# Patient Record
Sex: FEMALE | Employment: FULL TIME | URBAN - METROPOLITAN AREA
[De-identification: names, ages, dates, MRNs, and addresses within clinical notes are randomized per-mention and may not be internally consistent; named-entity substitution may affect disease eponyms.]

---

## 2017-01-17 ENCOUNTER — TELEPHONE (OUTPATIENT)
Dept: OBGYN CLINIC | Facility: CLINIC | Age: 42
End: 2017-01-17

## 2017-01-17 DIAGNOSIS — Z12.31 VISIT FOR SCREENING MAMMOGRAM: Primary | ICD-10-CM

## 2017-02-15 ENCOUNTER — OFFICE VISIT (OUTPATIENT)
Dept: FAMILY MEDICINE CLINIC | Facility: CLINIC | Age: 42
End: 2017-02-15

## 2017-02-15 VITALS
DIASTOLIC BLOOD PRESSURE: 80 MMHG | RESPIRATION RATE: 16 BRPM | SYSTOLIC BLOOD PRESSURE: 110 MMHG | OXYGEN SATURATION: 98 % | HEART RATE: 92 BPM | TEMPERATURE: 98 F

## 2017-02-15 DIAGNOSIS — J02.9 SORE THROAT: Primary | ICD-10-CM

## 2017-02-15 DIAGNOSIS — J06.9 UPPER RESPIRATORY VIRUS: ICD-10-CM

## 2017-02-15 LAB
CONTROL LINE PRESENT WITH A CLEAR BACKGROUND (YES/NO): YES YES/NO
STREP GRP A CUL-SCR: NEGATIVE

## 2017-02-15 PROCEDURE — 99213 OFFICE O/P EST LOW 20 MIN: CPT | Performed by: NURSE PRACTITIONER

## 2017-02-15 PROCEDURE — 87880 STREP A ASSAY W/OPTIC: CPT | Performed by: NURSE PRACTITIONER

## 2017-02-15 RX ORDER — CODEINE PHOSPHATE AND GUAIFENESIN 10; 100 MG/5ML; MG/5ML
5 SOLUTION ORAL 3 TIMES DAILY PRN
Qty: 118 ML | Refills: 0 | Status: SHIPPED | OUTPATIENT
Start: 2017-02-15 | End: 2019-09-17

## 2017-02-15 RX ORDER — PREDNISONE 20 MG/1
40 TABLET ORAL DAILY
Qty: 10 TABLET | Refills: 0 | Status: SHIPPED | OUTPATIENT
Start: 2017-02-15 | End: 2017-02-20

## 2017-02-15 NOTE — PROGRESS NOTES
CHIEF COMPLAINT:   Patient presents with:  Pharyngitis: x 7 days. has been waxing and waning. cough. no sob or wheezing. worse at night, keeping her up. no fevers. appetite normal. congestion.          HPI:   Rob Rawls is a 42year old female pre GENERAL: well developed, well nourished,in no apparent distress  SKIN: no rashes,no suspicious lesions  HEAD: atraumatic, normocephalic  EYES: conjunctiva clear, EOM intact  EARS: TM's clear, non-injected, no bulging, retraction, or fluid bilaterally  NOSE Discussed possibility of mononucleosis infection, but at this time symptoms are not reflective of mono infection. If s/sx persist will consider MonoSpot or further lab work.      Comfort measures explained and discussed as listed in Patient Instructions · Your appetite may be poor, so a light diet is fine. Avoid dehydration by drinking 6 to 8 glasses of fluids per day (water, soft drinks, juices, tea, or soup). Extra fluids will help loosen secretions in the nose and lungs.   · Over-the-counter cold medici

## 2017-02-24 ENCOUNTER — HOSPITAL ENCOUNTER (OUTPATIENT)
Dept: MAMMOGRAPHY | Facility: HOSPITAL | Age: 42
Discharge: HOME | End: 2017-02-24
Attending: OBSTETRICS & GYNECOLOGY
Payer: COMMERCIAL

## 2017-02-24 DIAGNOSIS — Z12.31 VISIT FOR SCREENING MAMMOGRAM: ICD-10-CM

## 2017-02-24 PROCEDURE — 77067 SCR MAMMO BI INCL CAD: CPT

## 2017-12-08 ENCOUNTER — IMMUNIZATION (OUTPATIENT)
Dept: FAMILY MEDICINE CLINIC | Facility: CLINIC | Age: 42
End: 2017-12-08

## 2017-12-08 DIAGNOSIS — Z23 NEED FOR VACCINATION: ICD-10-CM

## 2017-12-08 PROCEDURE — 90471 IMMUNIZATION ADMIN: CPT | Performed by: NURSE PRACTITIONER

## 2017-12-08 PROCEDURE — 90686 IIV4 VACC NO PRSV 0.5 ML IM: CPT | Performed by: NURSE PRACTITIONER

## 2018-06-20 ENCOUNTER — TELEPHONE (OUTPATIENT)
Dept: OBGYN CLINIC | Facility: CLINIC | Age: 43
End: 2018-06-20

## 2018-06-20 NOTE — TELEPHONE ENCOUNTER
[phone] (home)  Per pt found lump on breast, would liek to be seen asap. MLM's first available next week. Pt ok with seeing other provider for breast exam asap. Apt made with AJb for tomorrow.

## 2018-06-21 ENCOUNTER — OFFICE VISIT (OUTPATIENT)
Dept: OBGYN CLINIC | Facility: CLINIC | Age: 43
End: 2018-06-21

## 2018-06-21 VITALS — SYSTOLIC BLOOD PRESSURE: 129 MMHG | DIASTOLIC BLOOD PRESSURE: 87 MMHG

## 2018-06-21 DIAGNOSIS — N63.0 BREAST SWELLING: Primary | ICD-10-CM

## 2018-06-21 PROCEDURE — 99213 OFFICE O/P EST LOW 20 MIN: CPT | Performed by: OBSTETRICS & GYNECOLOGY

## 2018-06-21 RX ORDER — HYDROCODONE BITARTRATE AND ACETAMINOPHEN 10; 325 MG/1; MG/1
1 TABLET ORAL
COMMUNITY
Start: 2017-01-31 | End: 2019-09-17

## 2018-06-21 NOTE — PROGRESS NOTES
HPI:    Patient ID: Philly Sultnaa is a 36year old female. HPI  New patient problem  Patient of Dr. Quintin Tubbs noting that her right breast is sore, tender and swollen. Involving the inner half. Denies trauma or discrete lump noted.   Denies re

## 2018-06-22 ENCOUNTER — HOSPITAL ENCOUNTER (OUTPATIENT)
Dept: ULTRASOUND IMAGING | Facility: HOSPITAL | Age: 43
Discharge: HOME | End: 2018-06-22
Attending: OBSTETRICS & GYNECOLOGY
Payer: COMMERCIAL

## 2018-06-22 ENCOUNTER — HOSPITAL ENCOUNTER (OUTPATIENT)
Dept: MAMMOGRAPHY | Facility: HOSPITAL | Age: 43
Discharge: HOME | End: 2018-06-22
Attending: OBSTETRICS & GYNECOLOGY
Payer: COMMERCIAL

## 2018-06-22 DIAGNOSIS — N63.0 BREAST SWELLING: ICD-10-CM

## 2018-06-22 PROCEDURE — 77066 DX MAMMO INCL CAD BI: CPT | Performed by: OBSTETRICS & GYNECOLOGY

## 2018-06-22 PROCEDURE — 76642 ULTRASOUND BREAST LIMITED: CPT | Performed by: OBSTETRICS & GYNECOLOGY

## 2018-06-23 ENCOUNTER — TELEPHONE (OUTPATIENT)
Dept: OBGYN CLINIC | Facility: CLINIC | Age: 43
End: 2018-06-23

## 2018-06-23 NOTE — TELEPHONE ENCOUNTER
Pt informed of result. Verbalized understanding. Will call back Monday to make apt with Dr. Evelyn Naqvi. No further question.

## 2018-06-23 NOTE — TELEPHONE ENCOUNTER
----- Message from Sarabjit Mason MD sent at 6/22/2018  3:11 PM CDT -----  Please inform that mammogram and ultrasound do not show any abnormalities. She should return for a breast exam with Dr. Geraldine Cotto in < month preferably after menses.

## 2019-09-17 ENCOUNTER — OFFICE VISIT (OUTPATIENT)
Dept: OBGYN CLINIC | Facility: CLINIC | Age: 44
End: 2019-09-17
Payer: COMMERCIAL

## 2019-09-17 VITALS
BODY MASS INDEX: 24 KG/M2 | WEIGHT: 147 LBS | DIASTOLIC BLOOD PRESSURE: 89 MMHG | HEART RATE: 90 BPM | SYSTOLIC BLOOD PRESSURE: 125 MMHG

## 2019-09-17 DIAGNOSIS — Z01.419 WOMEN'S ANNUAL ROUTINE GYNECOLOGICAL EXAMINATION: Primary | ICD-10-CM

## 2019-09-17 DIAGNOSIS — R92.2 DENSE BREAST: ICD-10-CM

## 2019-09-17 DIAGNOSIS — Z12.31 SCREENING MAMMOGRAM, ENCOUNTER FOR: ICD-10-CM

## 2019-09-17 DIAGNOSIS — Z98.82 H/O BILATERAL BREAST IMPLANTS: ICD-10-CM

## 2019-09-17 DIAGNOSIS — F41.9 ANXIETY AND DEPRESSION: ICD-10-CM

## 2019-09-17 DIAGNOSIS — F32.A ANXIETY AND DEPRESSION: ICD-10-CM

## 2019-09-17 PROCEDURE — 99396 PREV VISIT EST AGE 40-64: CPT | Performed by: ADVANCED PRACTICE MIDWIFE

## 2019-09-17 RX ORDER — ZOLPIDEM TARTRATE 10 MG/1
TABLET ORAL NIGHTLY PRN
COMMUNITY

## 2019-09-17 RX ORDER — BUPROPION HYDROCHLORIDE 150 MG/1
150 TABLET ORAL DAILY
Qty: 90 TABLET | Refills: 1 | Status: SHIPPED | OUTPATIENT
Start: 2019-09-17 | End: 2019-09-17

## 2019-09-17 RX ORDER — HYDROCHLOROTHIAZIDE 25 MG/1
TABLET ORAL
Refills: 1 | COMMUNITY
Start: 2019-08-06

## 2019-09-17 RX ORDER — AMLODIPINE BESYLATE 5 MG/1
TABLET ORAL
Refills: 1 | COMMUNITY
Start: 2019-08-06

## 2019-09-17 RX ORDER — BUPROPION HYDROCHLORIDE 150 MG/1
150 TABLET ORAL DAILY
Qty: 90 TABLET | Refills: 1 | Status: SHIPPED | OUTPATIENT
Start: 2019-09-17 | End: 2020-03-04

## 2019-09-17 NOTE — PROGRESS NOTES
HPI:    Patient ID: Aixa Quigley is a 38year old female. Has a history of anxiety and depression. Has been on medication in the past.  Is asking to restart medication today. Had a hysterectomy 2 years ago for AUB.   No past h/o abnormal pap smea N/A 2016    Performed by Farzaneh Torres MD at [facility] MAIN OR   • BREAST SURGERY      breast augmentation   •       x2   • COLONOSCOPY     • FOOT SURGERY Left    • OTHER SURGICAL HISTORY      ureterocele surgery      Family History change and no tenderness. Abdominal: Soft. She exhibits no distension and no mass. There is no tenderness. There is no rebound and no guarding. Genitourinary: Rectum normal and vagina normal. There is no rash, tenderness or lesion on the right labia.  Annemarie Yeung Imaging & Referrals:  [facility] ABRAN 2D+3D SCREENING BILAT (CPT=77067/38315)       #[id number]

## 2019-09-18 LAB — HPV I/H RISK 1 DNA SPEC QL NAA+PROBE: POSITIVE

## 2019-09-19 LAB
HPV16 DNA CVX QL PROBE+SIG AMP: NEGATIVE
HPV18 DNA CVX QL PROBE+SIG AMP: NEGATIVE

## 2019-09-23 ENCOUNTER — TELEPHONE (OUTPATIENT)
Dept: OBGYN CLINIC | Facility: CLINIC | Age: 44
End: 2019-09-23

## 2019-09-23 ENCOUNTER — MED REC SCAN ONLY (OUTPATIENT)
Dept: OBGYN CLINIC | Facility: CLINIC | Age: 44
End: 2019-09-23

## 2019-09-23 NOTE — TELEPHONE ENCOUNTER
Discussed results of pap smear. Suggested that she see Dr. Den Gilbert.   Will send pathology and labs to him and she should call for appt tomorrow

## 2019-09-23 NOTE — TELEPHONE ENCOUNTER
----- Message from Mary Lopez sent at 9/23/2019  1:41 PM CDT -----  Regarding: RE: Test Results Question  Contact: [phone]  Could you please call me? I am very concerned.     Thank you,    Talib Martínez  [phone]  ----- Message -----  From: Teresa Ramirez

## 2019-09-25 ENCOUNTER — HOSPITAL ENCOUNTER (OUTPATIENT)
Dept: MAMMOGRAPHY | Age: 44
Discharge: HOME | End: 2019-09-25
Attending: ADVANCED PRACTICE MIDWIFE
Payer: COMMERCIAL

## 2019-09-25 DIAGNOSIS — R92.2 DENSE BREAST: ICD-10-CM

## 2019-09-25 DIAGNOSIS — Z98.82 H/O BILATERAL BREAST IMPLANTS: ICD-10-CM

## 2019-09-25 PROCEDURE — 77063 BREAST TOMOSYNTHESIS BI: CPT | Performed by: ADVANCED PRACTICE MIDWIFE

## 2019-09-25 PROCEDURE — 77067 SCR MAMMO BI INCL CAD: CPT | Performed by: ADVANCED PRACTICE MIDWIFE

## 2019-09-25 NOTE — TELEPHONE ENCOUNTER
Marita Montenegro, APRN         9/23/19 3:54 PM   Note      Discussed results of pap smear. Suggested that she see Dr. Octaviano Garcia.   Will send pathology and labs to him and she should call for appt tomorrow

## 2019-09-30 ENCOUNTER — HOSPITAL ENCOUNTER (OUTPATIENT)
Dept: ULTRASOUND IMAGING | Facility: HOSPITAL | Age: 44
Discharge: HOME | End: 2019-09-30
Attending: ADVANCED PRACTICE MIDWIFE
Payer: COMMERCIAL

## 2019-09-30 ENCOUNTER — TELEPHONE (OUTPATIENT)
Dept: OBGYN CLINIC | Facility: CLINIC | Age: 44
End: 2019-09-30

## 2019-09-30 ENCOUNTER — HOSPITAL ENCOUNTER (OUTPATIENT)
Dept: MAMMOGRAPHY | Facility: HOSPITAL | Age: 44
Discharge: HOME | End: 2019-09-30
Attending: ADVANCED PRACTICE MIDWIFE
Payer: COMMERCIAL

## 2019-09-30 DIAGNOSIS — R92.8 ABNORMAL MAMMOGRAM: ICD-10-CM

## 2019-09-30 DIAGNOSIS — R92.2 INCONCLUSIVE MAMMOGRAM: Primary | ICD-10-CM

## 2019-09-30 PROCEDURE — 77061 BREAST TOMOSYNTHESIS UNI: CPT | Performed by: ADVANCED PRACTICE MIDWIFE

## 2019-09-30 PROCEDURE — 76642 ULTRASOUND BREAST LIMITED: CPT | Performed by: ADVANCED PRACTICE MIDWIFE

## 2019-09-30 PROCEDURE — 77065 DX MAMMO INCL CAD UNI: CPT | Performed by: ADVANCED PRACTICE MIDWIFE

## 2019-09-30 NOTE — TELEPHONE ENCOUNTER
Pt informed per SJM, recommendation for 6 month right breast follow up was from recent additional views of right breast.   Pt verbalized understanding. No further question.

## 2019-09-30 NOTE — TELEPHONE ENCOUNTER
Pt informed of message below. Pt voices she already had second mammogram done today. Pt was told to follow up in 6 months with a right breast u/s. Does pt still need additional views?     Notes recorded by NOELLE Merchant on 9/30/2019 at 11:09 AM LUIST

## 2019-09-30 NOTE — TELEPHONE ENCOUNTER
No I believe the additional views were done and this is what gave the information of 6 month f/u.   I did not believe the orders had been placed

## 2020-02-27 ENCOUNTER — PATIENT MESSAGE (OUTPATIENT)
Dept: OBGYN CLINIC | Facility: CLINIC | Age: 45
End: 2020-02-27

## 2020-02-27 DIAGNOSIS — N64.4 BREAST PAIN, RIGHT: ICD-10-CM

## 2020-02-27 DIAGNOSIS — N63.0 LUMP OR MASS IN BREAST: Primary | ICD-10-CM

## 2020-02-29 NOTE — TELEPHONE ENCOUNTER
RN placed call to patient. Pt advised of recommendation for clinical breast exam and right breast diagnostic mammo. Order entered for diagnostic mammo. Pt scheduled for office visit with SJM on 3/4/2020. Provider notified.

## 2020-03-04 ENCOUNTER — OFFICE VISIT (OUTPATIENT)
Dept: OBGYN CLINIC | Facility: CLINIC | Age: 45
End: 2020-03-04
Payer: COMMERCIAL

## 2020-03-04 VITALS — BODY MASS INDEX: 24 KG/M2 | SYSTOLIC BLOOD PRESSURE: 120 MMHG | WEIGHT: 149 LBS | DIASTOLIC BLOOD PRESSURE: 80 MMHG

## 2020-03-04 DIAGNOSIS — N64.4 BREAST PAIN, RIGHT: Primary | ICD-10-CM

## 2020-03-04 PROCEDURE — 99213 OFFICE O/P EST LOW 20 MIN: CPT | Performed by: ADVANCED PRACTICE MIDWIFE

## 2020-03-04 NOTE — PROGRESS NOTES
S  Is having pain in the right breast.  Comes and goes. Is a sharp shooting pain. Is not having menses. Consumes 1 cup or coffeee or diet coke daily. Occasionally takes vitamins. Has not started any new exercise. Has not purchased any new bras.   Leanna Barriga

## 2020-03-05 ENCOUNTER — HOSPITAL ENCOUNTER (OUTPATIENT)
Dept: ULTRASOUND IMAGING | Facility: HOSPITAL | Age: 45
Discharge: HOME | End: 2020-03-05
Attending: ADVANCED PRACTICE MIDWIFE
Payer: COMMERCIAL

## 2020-03-05 ENCOUNTER — HOSPITAL ENCOUNTER (OUTPATIENT)
Dept: MAMMOGRAPHY | Facility: HOSPITAL | Age: 45
Discharge: HOME | End: 2020-03-05
Attending: ADVANCED PRACTICE MIDWIFE
Payer: COMMERCIAL

## 2020-03-05 DIAGNOSIS — N63.0 LUMP OR MASS IN BREAST: ICD-10-CM

## 2020-03-05 DIAGNOSIS — N64.4 BREAST PAIN, RIGHT: ICD-10-CM

## 2020-03-05 PROCEDURE — 76642 ULTRASOUND BREAST LIMITED: CPT | Performed by: ADVANCED PRACTICE MIDWIFE

## 2020-03-05 PROCEDURE — 77065 DX MAMMO INCL CAD UNI: CPT | Performed by: ADVANCED PRACTICE MIDWIFE

## 2020-03-05 PROCEDURE — 77061 BREAST TOMOSYNTHESIS UNI: CPT | Performed by: ADVANCED PRACTICE MIDWIFE

## 2020-03-19 RX ORDER — BUPROPION HYDROCHLORIDE 150 MG/1
TABLET ORAL
Qty: 90 TABLET | Refills: 1 | OUTPATIENT
Start: 2020-03-19

## 2020-10-15 ENCOUNTER — TELEPHONE (OUTPATIENT)
Dept: OBGYN CLINIC | Facility: CLINIC | Age: 45
End: 2020-10-15

## 2020-10-15 DIAGNOSIS — Z12.31 ENCOUNTER FOR SCREENING MAMMOGRAM FOR MALIGNANT NEOPLASM OF BREAST: Primary | ICD-10-CM

## 2020-10-15 NOTE — TELEPHONE ENCOUNTER
Nitza Galeas  Patient HM Schedule Request Pool 2 days ago        Appointment Request From: Talita Farias     With Provider: Ivonne Jauregui MD [facility] ]     Preferred Date Range: From 10/13/2020 To 10/23/20

## 2020-12-15 ENCOUNTER — HOSPITAL ENCOUNTER (OUTPATIENT)
Dept: MAMMOGRAPHY | Facility: HOSPITAL | Age: 45
Discharge: HOME | End: 2020-12-15
Attending: ADVANCED PRACTICE MIDWIFE
Payer: COMMERCIAL

## 2020-12-15 ENCOUNTER — TELEPHONE (OUTPATIENT)
Dept: OBGYN CLINIC | Facility: CLINIC | Age: 45
End: 2020-12-15

## 2020-12-15 DIAGNOSIS — Z87.898 HISTORY OF ABNORMAL MAMMOGRAM: Primary | ICD-10-CM

## 2020-12-15 DIAGNOSIS — Z12.31 ENCOUNTER FOR SCREENING MAMMOGRAM FOR MALIGNANT NEOPLASM OF BREAST: ICD-10-CM

## 2020-12-15 NOTE — TELEPHONE ENCOUNTER
Patient called very frustrated. She was scheduled today for a 6 month diagnostic breast mammogram and ultrasound.   Turns out the appointment was scheduled improperly because the order was written for a 12 month routine mammogram and she was turned away an

## 2020-12-16 NOTE — TELEPHONE ENCOUNTER
Noted per recommendations of most recent right breast dx imaging from 03/05/2020  pt was to have a follow up Diagnostic Mammogram bilateral in 6 months with u/s of right breast  for f/u of right breast mass. Also has hx of breast implants per epic.   Pended

## 2020-12-22 NOTE — TELEPHONE ENCOUNTER
Called mammogram dept to verify pt's order has been placed correctly. Spoke with Leodan whom states that the order is correct.  Per Leodan pt can call central scheduling and can attempt to be scheduled asap after explaining the situation she went through [facility]

## 2021-01-13 ENCOUNTER — HOSPITAL ENCOUNTER (OUTPATIENT)
Dept: MAMMOGRAPHY | Facility: HOSPITAL | Age: 46
Discharge: HOME | End: 2021-01-13
Attending: ADVANCED PRACTICE MIDWIFE
Payer: COMMERCIAL

## 2021-01-13 ENCOUNTER — HOSPITAL ENCOUNTER (OUTPATIENT)
Dept: ULTRASOUND IMAGING | Facility: HOSPITAL | Age: 46
Discharge: HOME | End: 2021-01-13
Attending: ADVANCED PRACTICE MIDWIFE
Payer: COMMERCIAL

## 2021-01-13 DIAGNOSIS — Z87.898 HISTORY OF ABNORMAL MAMMOGRAM: ICD-10-CM

## 2021-01-13 PROCEDURE — 77062 BREAST TOMOSYNTHESIS BI: CPT | Performed by: ADVANCED PRACTICE MIDWIFE

## 2021-01-13 PROCEDURE — 77066 DX MAMMO INCL CAD BI: CPT | Performed by: ADVANCED PRACTICE MIDWIFE

## 2021-01-13 PROCEDURE — 76642 ULTRASOUND BREAST LIMITED: CPT | Performed by: ADVANCED PRACTICE MIDWIFE

## 2021-04-13 ENCOUNTER — LAB ENCOUNTER (OUTPATIENT)
Dept: LAB | Age: 46
End: 2021-04-13
Attending: INTERNAL MEDICINE
Payer: COMMERCIAL

## 2021-04-13 ENCOUNTER — OFFICE VISIT (OUTPATIENT)
Dept: INTERNAL MEDICINE CLINIC | Facility: CLINIC | Age: 46
End: 2021-04-13
Payer: COMMERCIAL

## 2021-04-13 VITALS
HEART RATE: 88 BPM | DIASTOLIC BLOOD PRESSURE: 80 MMHG | TEMPERATURE: 97 F | SYSTOLIC BLOOD PRESSURE: 100 MMHG | WEIGHT: 168 LBS | BODY MASS INDEX: 27 KG/M2 | HEIGHT: 66 IN

## 2021-04-13 DIAGNOSIS — R19.4 BOWEL HABIT CHANGES: ICD-10-CM

## 2021-04-13 DIAGNOSIS — Z76.89 ENCOUNTER TO ESTABLISH CARE: Primary | ICD-10-CM

## 2021-04-13 DIAGNOSIS — Z00.00 ANNUAL PHYSICAL EXAM: ICD-10-CM

## 2021-04-13 PROCEDURE — 3074F SYST BP LT 130 MM HG: CPT | Performed by: INTERNAL MEDICINE

## 2021-04-13 PROCEDURE — 36415 COLL VENOUS BLD VENIPUNCTURE: CPT | Performed by: INTERNAL MEDICINE

## 2021-04-13 PROCEDURE — 85652 RBC SED RATE AUTOMATED: CPT

## 2021-04-13 PROCEDURE — 3079F DIAST BP 80-89 MM HG: CPT | Performed by: INTERNAL MEDICINE

## 2021-04-13 PROCEDURE — 84439 ASSAY OF FREE THYROXINE: CPT

## 2021-04-13 PROCEDURE — 80053 COMPREHEN METABOLIC PANEL: CPT

## 2021-04-13 PROCEDURE — 3008F BODY MASS INDEX DOCD: CPT | Performed by: INTERNAL MEDICINE

## 2021-04-13 PROCEDURE — 83540 ASSAY OF IRON: CPT

## 2021-04-13 PROCEDURE — 85025 COMPLETE CBC W/AUTO DIFF WBC: CPT

## 2021-04-13 PROCEDURE — 84443 ASSAY THYROID STIM HORMONE: CPT

## 2021-04-13 PROCEDURE — 80061 LIPID PANEL: CPT

## 2021-04-13 PROCEDURE — 84466 ASSAY OF TRANSFERRIN: CPT

## 2021-04-13 PROCEDURE — 99203 OFFICE O/P NEW LOW 30 MIN: CPT | Performed by: INTERNAL MEDICINE

## 2021-04-13 PROCEDURE — 83036 HEMOGLOBIN GLYCOSYLATED A1C: CPT | Performed by: INTERNAL MEDICINE

## 2021-04-13 RX ORDER — ESCITALOPRAM OXALATE 10 MG/1
15 TABLET ORAL DAILY
COMMUNITY
Start: 2021-03-22

## 2021-04-13 NOTE — PROGRESS NOTES
HPI:    Patient ID: Jj Maravilla is a 54year old female.     HPIpatient is new to the practice with a few months complaints of irregularity of bowel movements, loose, more frequent , intermittent abdominal pain which migrates, dark stools, and some B mouth nightly as needed. Allergies:No Known Allergies   PHYSICAL EXAM:   /80   Pulse 88   Temp 97.4 °F (36.3 °C)   Ht 5' 6\" (1.676 m)   Wt 168 lb (76.2 kg)   LMP 11/03/2016   BMI 27.12 kg/m²      Physical Exam  Vitals reviewed.    Amelie habit changes  Check thyroid functions, patient tried and failed a Gluten free diet. Check sed rate  See GI   Check cbc and iron levels. Encounter to establish care  Physical today .    Labs   See Gyne        Orders Placed This Encounter      CBC W

## 2021-04-13 NOTE — ASSESSMENT & PLAN NOTE
Check thyroid functions, patient tried and failed a Gluten free diet. Check sed rate  See GI   Check cbc and iron levels.

## 2021-04-29 NOTE — PROGRESS NOTES
[address] [facility] [address] Gastroenterology                                                                                                  Clinic History and Physical     Pa Unremarkable    Social Hx:  - No smoking  - No ETOH  - Denies illicit drug use  - LMP: Hysterectomy  - Occupation: Real estate  - Lives with spouse  - NSAIDs/ASA use: PRN    History, Medications, Allergies, ROS:      Past Medical History:   Diagnosis Date Allergies:  No Known Allergies    ROS:   CONSTITUTIONAL:  negative for fevers, rigors  EYES:  negative for diplopia   RESPIRATORY:  negative for severe shortness of breath  CARDIOVASCULAR:  negative for crushing sub-sternal chest pain  GASTROINTESTIN habits/rectal bleeding: The patient reports a 2-3-month history of irregular bowel habits which between looser/diarrheal stools and constipation with rectal bleeding. She denies hematochezia. She describes dark stools however this has since resolved.   CB of delayed/missed diagnosis), benefits, and alternatives to colonoscopy with the patient [who demonstrated understanding], including but not limited to the risks of bleeding, infection, pain, as well as the risks of anesthesia and perforation all leading t

## 2021-05-13 ENCOUNTER — OFFICE VISIT (OUTPATIENT)
Dept: GASTROENTEROLOGY | Facility: CLINIC | Age: 46
End: 2021-05-13
Payer: COMMERCIAL

## 2021-05-13 ENCOUNTER — LAB ENCOUNTER (OUTPATIENT)
Dept: LAB | Facility: HOSPITAL | Age: 46
End: 2021-05-13
Attending: NURSE PRACTITIONER
Payer: COMMERCIAL

## 2021-05-13 ENCOUNTER — TELEPHONE (OUTPATIENT)
Dept: GASTROENTEROLOGY | Facility: CLINIC | Age: 46
End: 2021-05-13

## 2021-05-13 VITALS
HEIGHT: 66 IN | SYSTOLIC BLOOD PRESSURE: 127 MMHG | WEIGHT: 165 LBS | BODY MASS INDEX: 26.52 KG/M2 | HEART RATE: 87 BPM | DIASTOLIC BLOOD PRESSURE: 84 MMHG

## 2021-05-13 DIAGNOSIS — Z12.12 SCREENING FOR COLORECTAL CANCER: Primary | ICD-10-CM

## 2021-05-13 DIAGNOSIS — Z83.71 FAMILY HISTORY OF COLONIC POLYPS: ICD-10-CM

## 2021-05-13 DIAGNOSIS — R19.4 ALTERED BOWEL HABITS: ICD-10-CM

## 2021-05-13 DIAGNOSIS — Z12.11 SCREENING FOR COLORECTAL CANCER: Primary | ICD-10-CM

## 2021-05-13 DIAGNOSIS — Z12.11 SCREENING FOR COLON CANCER: ICD-10-CM

## 2021-05-13 DIAGNOSIS — K62.5 RECTAL BLEEDING: ICD-10-CM

## 2021-05-13 DIAGNOSIS — R19.4 ALTERED BOWEL HABITS: Primary | ICD-10-CM

## 2021-05-13 PROCEDURE — 99243 OFF/OP CNSLTJ NEW/EST LOW 30: CPT | Performed by: NURSE PRACTITIONER

## 2021-05-13 PROCEDURE — 82784 ASSAY IGA/IGD/IGG/IGM EACH: CPT | Performed by: NURSE PRACTITIONER

## 2021-05-13 PROCEDURE — 83516 IMMUNOASSAY NONANTIBODY: CPT

## 2021-05-13 PROCEDURE — 3079F DIAST BP 80-89 MM HG: CPT | Performed by: NURSE PRACTITIONER

## 2021-05-13 PROCEDURE — 36415 COLL VENOUS BLD VENIPUNCTURE: CPT | Performed by: NURSE PRACTITIONER

## 2021-05-13 PROCEDURE — 3008F BODY MASS INDEX DOCD: CPT | Performed by: NURSE PRACTITIONER

## 2021-05-13 PROCEDURE — 3074F SYST BP LT 130 MM HG: CPT | Performed by: NURSE PRACTITIONER

## 2021-05-13 RX ORDER — SODIUM, POTASSIUM,MAG SULFATES 17.5-3.13G
SOLUTION, RECONSTITUTED, ORAL ORAL
Qty: 1 BOTTLE | Refills: 0 | Status: SHIPPED | OUTPATIENT
Start: 2021-05-13

## 2021-05-13 NOTE — TELEPHONE ENCOUNTER
KEN Tillman: I forwarded you my office visit note on this patient. You saw her previously in 2014 for abdominal pain. She presented today with complaints of irregular bowel habits, rectal bleeding, and a family history of colon polyps.   She is schedul

## 2021-05-13 NOTE — PATIENT INSTRUCTIONS
-Schedule colonoscopy with Dr. Juan Jose Whitfield with IV twilight or MAC  Dx: altered bowel habits, FH colon polyps, rectal bleeding   -Eligible for NE: Yes  -Prep: Split dose Suprep or Colyte/Trilyte   -Anti-platelets and anti-coagulants: None  -Diabetes meds: None

## 2021-05-13 NOTE — TELEPHONE ENCOUNTER
Scheduled for:  Colonoscopy 25730  Provider Name:  Dr Patel Engle  Date:  06/23/2021  Location:  [facility]  Sedation:  MAC  Time:  3:00pm (pt is aware that [facility] will call the day before to confirm arrival time)  Prep:  Suprep  Meds/Allergies Reconciled?:  Vania/HORACE juarez

## 2021-05-14 NOTE — TELEPHONE ENCOUNTER
Thanks Patria Velez! Your excellent note of 5/13/2021 reviewed. My previous visit as well as the MRI scan of January 2014 reviewed. Agree with colonoscopy examination.

## 2021-06-21 ENCOUNTER — TELEPHONE (OUTPATIENT)
Dept: GASTROENTEROLOGY | Facility: CLINIC | Age: 46
End: 2021-06-21

## 2021-06-21 NOTE — TELEPHONE ENCOUNTER
I spoke to the pt. She does not have a copy of her bowel prep instructions. She is active on MyChart so I sent her her instructions.  She will call with any further questions

## 2021-06-23 ENCOUNTER — SURGERY CENTER DOCUMENTATION (OUTPATIENT)
Dept: SURGERY | Age: 46
End: 2021-06-23

## 2021-06-23 ENCOUNTER — LAB REQUISITION (OUTPATIENT)
Dept: LAB | Facility: HOSPITAL | Age: 46
End: 2021-06-23
Payer: COMMERCIAL

## 2021-06-23 DIAGNOSIS — R19.4 CHANGE IN BOWEL HABIT: ICD-10-CM

## 2021-06-23 DIAGNOSIS — K62.5 HEMORRHAGE OF ANUS AND RECTUM: ICD-10-CM

## 2021-06-23 DIAGNOSIS — Z83.71 FAMILY HISTORY OF COLONIC POLYPS: ICD-10-CM

## 2021-06-23 DIAGNOSIS — Z12.11 ENCOUNTER FOR SCREENING FOR MALIGNANT NEOPLASM OF COLON: ICD-10-CM

## 2021-06-23 PROCEDURE — 88305 TISSUE EXAM BY PATHOLOGIST: CPT | Performed by: INTERNAL MEDICINE

## 2021-06-23 NOTE — PROCEDURES
[facility]      COLONOSCOPY PROCEDURE REPORT     DATE OF PROCEDURE:  6/23/2021     PCP: Rosalba Cheung MD     PREOPERATIVE DIAGNOSIS:  Screening colonoscopy examination     POSTOPERATIVE DIAGNOSIS:  See impression.      SURGEON:

## 2021-07-06 ENCOUNTER — TELEPHONE (OUTPATIENT)
Dept: GASTROENTEROLOGY | Facility: CLINIC | Age: 46
End: 2021-07-06

## 2021-07-06 NOTE — TELEPHONE ENCOUNTER
Results letter mailed to patient per Dr. Gallo Cos recall entered for repeat in 5 yrs,6/23/2026   Colon done in 6/23/2021  HM Updated and Patient Outreach was placed for Colon recall. Jacqueline White MD  P Em Gi Clinical Staff  GI RNs - 1.  P

## 2022-12-06 ENCOUNTER — OFFICE VISIT (OUTPATIENT)
Dept: OBGYN CLINIC | Facility: CLINIC | Age: 47
End: 2022-12-06
Payer: COMMERCIAL

## 2022-12-06 ENCOUNTER — TELEPHONE (OUTPATIENT)
Dept: OBGYN CLINIC | Facility: CLINIC | Age: 47
End: 2022-12-06

## 2022-12-06 VITALS
SYSTOLIC BLOOD PRESSURE: 129 MMHG | WEIGHT: 150 LBS | HEART RATE: 80 BPM | BODY MASS INDEX: 24 KG/M2 | DIASTOLIC BLOOD PRESSURE: 87 MMHG

## 2022-12-06 DIAGNOSIS — Z12.31 SCREENING MAMMOGRAM FOR BREAST CANCER: Primary | ICD-10-CM

## 2022-12-06 DIAGNOSIS — R87.811 VAGINAL HIGH RISK HPV DNA TEST POSITIVE: ICD-10-CM

## 2022-12-06 DIAGNOSIS — Z90.710 S/P HYSTERECTOMY: ICD-10-CM

## 2022-12-06 PROBLEM — Z76.89 ENCOUNTER TO ESTABLISH CARE: Status: RESOLVED | Noted: 2021-04-13 | Resolved: 2022-12-06

## 2022-12-06 PROBLEM — Z00.00 ANNUAL PHYSICAL EXAM: Status: RESOLVED | Noted: 2021-04-13 | Resolved: 2022-12-06

## 2022-12-06 PROBLEM — N64.4 BREAST PAIN, RIGHT: Status: RESOLVED | Noted: 2020-03-04 | Resolved: 2022-12-06

## 2022-12-06 PROBLEM — N63.0 BREAST SWELLING: Status: RESOLVED | Noted: 2018-06-21 | Resolved: 2022-12-06

## 2022-12-06 PROBLEM — R19.4 BOWEL HABIT CHANGES: Status: RESOLVED | Noted: 2021-04-13 | Resolved: 2022-12-06

## 2022-12-06 PROCEDURE — 99213 OFFICE O/P EST LOW 20 MIN: CPT | Performed by: ADVANCED PRACTICE MIDWIFE

## 2022-12-06 PROCEDURE — 3079F DIAST BP 80-89 MM HG: CPT | Performed by: ADVANCED PRACTICE MIDWIFE

## 2022-12-06 PROCEDURE — 3074F SYST BP LT 130 MM HG: CPT | Performed by: ADVANCED PRACTICE MIDWIFE

## 2022-12-20 ENCOUNTER — OFFICE VISIT (OUTPATIENT)
Dept: OBGYN CLINIC | Facility: CLINIC | Age: 47
End: 2022-12-20
Payer: COMMERCIAL

## 2022-12-20 ENCOUNTER — LAB ENCOUNTER (OUTPATIENT)
Dept: LAB | Age: 47
End: 2022-12-20
Attending: ADVANCED PRACTICE MIDWIFE
Payer: COMMERCIAL

## 2022-12-20 VITALS — DIASTOLIC BLOOD PRESSURE: 98 MMHG | SYSTOLIC BLOOD PRESSURE: 137 MMHG | HEART RATE: 101 BPM

## 2022-12-20 DIAGNOSIS — Z01.419 WOMEN'S ANNUAL ROUTINE GYNECOLOGICAL EXAMINATION: ICD-10-CM

## 2022-12-20 DIAGNOSIS — Z13.220 SCREENING, LIPID: ICD-10-CM

## 2022-12-20 DIAGNOSIS — Z13.29 SCREENING FOR THYROID DISORDER: ICD-10-CM

## 2022-12-20 DIAGNOSIS — Z13.0 SCREENING, ANEMIA, DEFICIENCY, IRON: ICD-10-CM

## 2022-12-20 DIAGNOSIS — Z23 FLU VACCINE NEED: ICD-10-CM

## 2022-12-20 DIAGNOSIS — Z01.419 WOMEN'S ANNUAL ROUTINE GYNECOLOGICAL EXAMINATION: Primary | ICD-10-CM

## 2022-12-20 DIAGNOSIS — Z13.1 SCREENING FOR DIABETES MELLITUS: ICD-10-CM

## 2022-12-20 DIAGNOSIS — Z86.19 HISTORY OF HPV INFECTION: ICD-10-CM

## 2022-12-20 LAB
ALBUMIN SERPL-MCNC: 3.4 G/DL (ref 3.4–5)
ALBUMIN/GLOB SERPL: 0.9 {RATIO} (ref 1–2)
ALP LIVER SERPL-CCNC: 68 U/L
ALT SERPL-CCNC: 28 U/L
ANION GAP SERPL CALC-SCNC: 10 MMOL/L (ref 0–18)
AST SERPL-CCNC: 31 U/L (ref 15–37)
BILIRUB SERPL-MCNC: 0.6 MG/DL (ref 0.1–2)
BUN BLD-MCNC: 16 MG/DL (ref 7–18)
BUN/CREAT SERPL: 18.2 (ref 10–20)
CALCIUM BLD-MCNC: 9.1 MG/DL (ref 8.5–10.1)
CHLORIDE SERPL-SCNC: 100 MMOL/L (ref 98–112)
CHOLEST SERPL-MCNC: 223 MG/DL (ref ?–200)
CO2 SERPL-SCNC: 27 MMOL/L (ref 21–32)
CREAT BLD-MCNC: 0.88 MG/DL
DEPRECATED RDW RBC AUTO: 40.4 FL (ref 35.1–46.3)
ERYTHROCYTE [DISTWIDTH] IN BLOOD BY AUTOMATED COUNT: 11.4 % (ref 11–15)
EST. AVERAGE GLUCOSE BLD GHB EST-MCNC: 97 MG/DL (ref 68–126)
FASTING PATIENT LIPID ANSWER: YES
FASTING STATUS PATIENT QL REPORTED: YES
GFR SERPLBLD BASED ON 1.73 SQ M-ARVRAT: 81 ML/MIN/1.73M2 (ref 60–?)
GLOBULIN PLAS-MCNC: 3.8 G/DL (ref 2.8–4.4)
GLUCOSE BLD-MCNC: 87 MG/DL (ref 70–99)
HBA1C MFR BLD: 5 % (ref ?–5.7)
HCT VFR BLD AUTO: 47.8 %
HDLC SERPL-MCNC: 78 MG/DL (ref 40–59)
HGB BLD-MCNC: 16.4 G/DL
LDLC SERPL CALC-MCNC: 95 MG/DL (ref ?–100)
MCH RBC QN AUTO: 33.1 PG (ref 26–34)
MCHC RBC AUTO-ENTMCNC: 34.3 G/DL (ref 31–37)
MCV RBC AUTO: 96.6 FL
NONHDLC SERPL-MCNC: 145 MG/DL (ref ?–130)
OSMOLALITY SERPL CALC.SUM OF ELEC: 285 MOSM/KG (ref 275–295)
PLATELET # BLD AUTO: 298 10(3)UL (ref 150–450)
POTASSIUM SERPL-SCNC: 3.7 MMOL/L (ref 3.5–5.1)
PROT SERPL-MCNC: 7.2 G/DL (ref 6.4–8.2)
RBC # BLD AUTO: 4.95 X10(6)UL
SODIUM SERPL-SCNC: 137 MMOL/L (ref 136–145)
TRIGL SERPL-MCNC: 303 MG/DL (ref 30–149)
TSI SER-ACNC: 1.38 MIU/ML (ref 0.36–3.74)
VLDLC SERPL CALC-MCNC: 50 MG/DL (ref 0–30)
WBC # BLD AUTO: 9.6 X10(3) UL (ref 4–11)

## 2022-12-20 PROCEDURE — 85027 COMPLETE CBC AUTOMATED: CPT

## 2022-12-20 PROCEDURE — 36415 COLL VENOUS BLD VENIPUNCTURE: CPT

## 2022-12-20 PROCEDURE — 99396 PREV VISIT EST AGE 40-64: CPT | Performed by: ADVANCED PRACTICE MIDWIFE

## 2022-12-20 PROCEDURE — 3075F SYST BP GE 130 - 139MM HG: CPT | Performed by: ADVANCED PRACTICE MIDWIFE

## 2022-12-20 PROCEDURE — 84443 ASSAY THYROID STIM HORMONE: CPT

## 2022-12-20 PROCEDURE — 3080F DIAST BP >= 90 MM HG: CPT | Performed by: ADVANCED PRACTICE MIDWIFE

## 2022-12-20 PROCEDURE — 80053 COMPREHEN METABOLIC PANEL: CPT

## 2022-12-20 PROCEDURE — 83036 HEMOGLOBIN GLYCOSYLATED A1C: CPT

## 2022-12-20 PROCEDURE — 80061 LIPID PANEL: CPT

## 2022-12-21 ENCOUNTER — TELEPHONE (OUTPATIENT)
Dept: OBGYN CLINIC | Facility: CLINIC | Age: 47
End: 2022-12-21

## 2022-12-21 ENCOUNTER — HOSPITAL ENCOUNTER (OUTPATIENT)
Dept: MAMMOGRAPHY | Age: 47
Discharge: HOME | End: 2022-12-21
Attending: ADVANCED PRACTICE MIDWIFE
Payer: COMMERCIAL

## 2022-12-21 DIAGNOSIS — Z12.31 SCREENING MAMMOGRAM FOR BREAST CANCER: ICD-10-CM

## 2022-12-21 LAB — HPV I/H RISK 1 DNA SPEC QL NAA+PROBE: NEGATIVE

## 2022-12-21 PROCEDURE — 77067 SCR MAMMO BI INCL CAD: CPT | Performed by: ADVANCED PRACTICE MIDWIFE

## 2022-12-21 PROCEDURE — 77063 BREAST TOMOSYNTHESIS BI: CPT | Performed by: ADVANCED PRACTICE MIDWIFE

## 2022-12-21 NOTE — TELEPHONE ENCOUNTER
----- Message from Louis Jorge CNM sent at 12/21/2022  4:49 PM CST -----  Please call her lipids are high and I recommend management by her PCP  She had high BP when I saw her so that needs following by her PCP as well. Also her HGB is high that should also be seen by her PCP

## 2023-11-03 ENCOUNTER — LAB ENCOUNTER (OUTPATIENT)
Dept: LAB | Facility: HOSPITAL | Age: 48
End: 2023-11-03
Attending: PODIATRIST
Payer: COMMERCIAL

## 2023-11-03 DIAGNOSIS — I82.401 DEEP VEIN THROMBOSIS OF RIGHT LOWER LIMB (HCC): ICD-10-CM

## 2023-11-03 DIAGNOSIS — G62.9 PERIPHERAL NERVE DISORDER: ICD-10-CM

## 2023-11-03 DIAGNOSIS — I73.89 OTHER SPECIFIED PERIPHERAL VASCULAR DISEASES (HCC): Primary | ICD-10-CM

## 2023-11-03 DIAGNOSIS — I82.409 ACUTE DEEP VEIN THROMBOSIS OF LEG (HCC): ICD-10-CM

## 2023-11-03 LAB
ANION GAP SERPL CALC-SCNC: 11 MMOL/L (ref 0–18)
BASOPHILS # BLD AUTO: 0.06 X10(3) UL (ref 0–0.2)
BASOPHILS NFR BLD AUTO: 0.9 %
BUN BLD-MCNC: 12 MG/DL (ref 9–23)
BUN/CREAT SERPL: 15.4 (ref 10–20)
CALCIUM BLD-MCNC: 10.1 MG/DL (ref 8.7–10.4)
CHLORIDE SERPL-SCNC: 101 MMOL/L (ref 98–112)
CO2 SERPL-SCNC: 28 MMOL/L (ref 21–32)
CREAT BLD-MCNC: 0.78 MG/DL
DEPRECATED RDW RBC AUTO: 43 FL (ref 35.1–46.3)
EGFRCR SERPLBLD CKD-EPI 2021: 93 ML/MIN/1.73M2 (ref 60–?)
EOSINOPHIL # BLD AUTO: 0.08 X10(3) UL (ref 0–0.7)
EOSINOPHIL NFR BLD AUTO: 1.2 %
ERYTHROCYTE [DISTWIDTH] IN BLOOD BY AUTOMATED COUNT: 11.9 % (ref 11–15)
FASTING STATUS PATIENT QL REPORTED: YES
GLUCOSE BLD-MCNC: 99 MG/DL (ref 70–99)
HCT VFR BLD AUTO: 46.3 %
HGB BLD-MCNC: 15.7 G/DL
IMM GRANULOCYTES # BLD AUTO: 0.05 X10(3) UL (ref 0–1)
IMM GRANULOCYTES NFR BLD: 0.7 %
LYMPHOCYTES # BLD AUTO: 2.37 X10(3) UL (ref 1–4)
LYMPHOCYTES NFR BLD AUTO: 35.4 %
MCH RBC QN AUTO: 33.2 PG (ref 26–34)
MCHC RBC AUTO-ENTMCNC: 33.9 G/DL (ref 31–37)
MCV RBC AUTO: 97.9 FL
MONOCYTES # BLD AUTO: 0.92 X10(3) UL (ref 0.1–1)
MONOCYTES NFR BLD AUTO: 13.8 %
NEUTROPHILS # BLD AUTO: 3.21 X10 (3) UL (ref 1.5–7.7)
NEUTROPHILS # BLD AUTO: 3.21 X10(3) UL (ref 1.5–7.7)
NEUTROPHILS NFR BLD AUTO: 48 %
OSMOLALITY SERPL CALC.SUM OF ELEC: 290 MOSM/KG (ref 275–295)
PLATELET # BLD AUTO: 353 10(3)UL (ref 150–450)
POTASSIUM SERPL-SCNC: 4 MMOL/L (ref 3.5–5.1)
RBC # BLD AUTO: 4.73 X10(6)UL
SODIUM SERPL-SCNC: 140 MMOL/L (ref 136–145)
WBC # BLD AUTO: 6.7 X10(3) UL (ref 4–11)

## 2023-11-03 PROCEDURE — 36415 COLL VENOUS BLD VENIPUNCTURE: CPT

## 2023-11-03 PROCEDURE — 80048 BASIC METABOLIC PNL TOTAL CA: CPT

## 2023-11-03 PROCEDURE — 85025 COMPLETE CBC W/AUTO DIFF WBC: CPT

## 2024-08-27 ENCOUNTER — OFFICE VISIT (OUTPATIENT)
Dept: OBGYN CLINIC | Facility: CLINIC | Age: 49
End: 2024-08-27
Payer: COMMERCIAL

## 2024-08-27 VITALS
SYSTOLIC BLOOD PRESSURE: 135 MMHG | WEIGHT: 160 LBS | BODY MASS INDEX: 25 KG/M2 | DIASTOLIC BLOOD PRESSURE: 93 MMHG | HEART RATE: 94 BPM

## 2024-08-27 DIAGNOSIS — Z12.31 SCREENING MAMMOGRAM FOR BREAST CANCER: ICD-10-CM

## 2024-08-27 DIAGNOSIS — Z13.228 SCREENING FOR METABOLIC DISORDER: ICD-10-CM

## 2024-08-27 DIAGNOSIS — Z86.19 HISTORY OF HPV INFECTION: ICD-10-CM

## 2024-08-27 DIAGNOSIS — N76.0 VAGINITIS AND VULVOVAGINITIS: ICD-10-CM

## 2024-08-27 DIAGNOSIS — Z01.419 WOMEN'S ANNUAL ROUTINE GYNECOLOGICAL EXAMINATION: Primary | ICD-10-CM

## 2024-08-27 DIAGNOSIS — N95.1 HOT FLASHES, MENOPAUSAL: ICD-10-CM

## 2024-09-01 PROBLEM — N95.1 HOT FLASHES, MENOPAUSAL: Status: ACTIVE | Noted: 2024-09-01

## 2024-09-01 NOTE — PROGRESS NOTES
Ada Hurd is a 49 year old female.    HPI:       Ada Hurd is a 49 year old female.   Patient's last menstrual period was 2016.    Chief Complaint   Patient presents with    Annual     Pt is here for an annual. Last pap was 2022 . Pt is due for a mammogram.    Pap in 2019 was positive for HPV  Pt reports hot flashes that wake her at night wants to know treatment options      Relationship: monogamous male partner  Abuse: denies  Diet: well balanced  Exercise: regular   Denies excessive ETOH use, no marijuana, smoking, vapping or any non prescriptive drug use.   Family hx of breast or ovarian CA: denies       Hx Prior Abnormal Pap: No  Pap Date: 22        Menarche: 14y/o  Use of Birth Control (if yes, specify type): Hysterectomy  Hx Prior Abnormal Pap: No  Pap Date: 22      HISTORY:  Past Medical History:    Annual physical exam    Anxiety    Back problem    Bowel habit changes    Breast pain, right    Breast swelling    Calculus of kidney    Encounter to establish care    Esophageal reflux    Essential hypertension    High blood pressure    Lumbar herniated disc    Migraines    Wears glasses      Past Surgical History:   Procedure Laterality Date    Breast surgery      breast augmentation          x2    Colonoscopy      Foot surgery Left     Hysterectomy N/A 2016    Procedure: ABDOMINAL HYSTERECTOMY;  Surgeon: Sven Denise MD;  Location: [facility] MAIN OR    Knee arthroscopy Right     acl repair.     Other surgical history      ureterocele surgery      Family History   Problem Relation Age of Onset    Hypertension Mother     Hypertension Father     Cancer Father 76        prostate    Pancreatic Cancer Maternal Grandfather 67    Cancer Maternal Grandfather 67        pancreatic    Breast Cancer Maternal Cousin Female 38    Cancer Other         per NG: family h/o renal cell - 8 (cause of death)    Ovarian Cancer Neg       Social History:   Social  History     Socioeconomic History    Marital status:     Number of children: 3   Occupational History    Occupation: stay at home mom   Tobacco Use    Smoking status: Never    Smokeless tobacco: Never   Vaping Use    Vaping status: Never Used   Substance and Sexual Activity    Alcohol use: Yes     Comment: socially    Drug use: No    Sexual activity: Yes   Other Topics Concern    Caffeine Concern Yes     Comment: 2 cups coffee daily        Medications (Active prior to today's visit):  Current Outpatient Medications   Medication Sig Dispense Refill    escitalopram 10 MG Oral Tab Take 1.5 tablets (15 mg total) by mouth daily.      amLODIPine Besylate 5 MG Oral Tab TK 1 T PO QD  1    hydrochlorothiazide 25 MG Oral Tab TK 1 T PO QD  1    Zolpidem Tartrate 10 MG Oral Tab Take by mouth nightly as needed.        Amphetamine-Dextroamphet ER 25 MG Oral Capsule SR 24 Hr Take 1 capsule (25 mg total) by mouth every morning. (Patient not taking: Reported on 8/27/2024) 30 capsule 0       Allergies:  No Known Allergies      ROS:     Review of Systems   Constitutional:  Positive for diaphoresis. Negative for activity change, appetite change, chills, fatigue, fever and unexpected weight change.   Respiratory: Negative.     Cardiovascular: Negative.    Endocrine: Positive for heat intolerance. Negative for cold intolerance, polydipsia, polyphagia and polyuria.   Genitourinary: Negative.    Psychiatric/Behavioral: Negative.           PHYSICAL EXAM:     Vitals:    08/27/24 1530   BP: (!) 135/93   Pulse: 94     Physical Exam  Constitutional:       General: She is not in acute distress.     Appearance: Normal appearance. She is normal weight. She is not ill-appearing, toxic-appearing or diaphoretic.   Cardiovascular:      Rate and Rhythm: Normal rate.   Pulmonary:      Effort: Pulmonary effort is normal.   Chest:   Breasts:     Right: Normal.      Left: Normal.   Genitourinary:     General: Normal vulva.      Exam position:  Lithotomy position.      Labia:         Right: No rash, tenderness, lesion or injury.         Left: No rash, tenderness, lesion or injury.       Vagina: Normal.      Uterus: Absent.       Comments: Pap of vaginal wall and cuff  Skin:     General: Skin is warm and dry.   Neurological:      Mental Status: She is alert and oriented to person, place, and time.          ASSESSMENT/PLAN:      Diagnoses and all orders for this visit:    Women's annual routine gynecological examination  -     ThinPrep PAP Smear; Future  -     THINPREP PAP SMEAR ONLY    Screening mammogram for breast cancer  -     [facility] ABRAN 2D+3D SCREENING BILAT (CPT=77067/63535); Future    History of HPV infection    Screening for metabolic disorder  -     Comp Metabolic Panel (14); Future    Hot flashes, menopausal    Vaginitis and vulvovaginitis        Reviewed PAP guidelines recommending q 5 year screening with HPV testing. Vaginal pap hx of HPV  Discussed breast awareness and SBE, and recommendations regarding mammography.  Order provided for screening mammogram.  Discussed weight management and regular exercise  Discussed Calcium and Vitamin D to maintain bone density  Offered patient STI screening and she declines  Reviewed hot flash treatment options and written information given  LFT drawn pt considering starting Vezoah       9/1/2024  Ambreen Pandya CNM

## 2024-09-01 NOTE — PATIENT INSTRUCTIONS
Treating Hot Flashes  Hot flashes are the most common bothersome symptom of menopause. Your hot flashes may occur during   the day or at night (also known as night sweats). Your hot flashes may be mild and tolerable, moderate and   troublesome, or severe and debilitating. Hot flashes get better with time. Although most women have hot   flashes for a few years, some women have them for decades. It is not known why some women have   severe hot flashes for many years while others have no hot flashes or mild ones that resolve quickly. If your   hot flashes are mild or moderate, you may find relief by changing your lifestyle. If you have severe hot   flashes, you may still benefit from lifestyle changes, but also may choose to take a nonprescription remedy   or a prescription medication, including hormones to help you manage your symptoms.  Lifestyle changes  Researchers find that women with hot flashes have more sensitive thermostats in their brain, so are   comfortable only in a small range of temperatures. Staying cool and reducing stress are the principal   lifestyle changes to treat your hot flashes. Some women can find relief with these options:  ? Avoid warm rooms, hot drinks, hot foods, alcohol, caffeine, excess stress, and cigarette smoking.   Wear layers of clothing made from light, breathable fabrics, removing a layer or two when you’re hot   and replacing them when you’re cooler. Cooling products, including sprays, gels, and the Chillow   pillow may be helpful.  ? To reduce stress and promote more restful sleep, exercise regularly, but not too close to bedtime.  Meditation, yoga, qigong, gelacio chi, biofeedback, acupuncture, or massage also will lower your stress   levels.  ? When a hot flash is starting, try “paced respiration”--slow, deep, abdominal breathing, in through   your nose and out through your mouth. Breathe only 5 to 7 times per minute, much more slowly than   usual.  ? Try different strategies to  stay cool while sleeping. Dress in light, breathable nightclothes. Use   layered bedding that can be easily removed during the night. Cool down with a bedside fan. Keep a  frozen cold pack or bag of frozen peas under your pillow, and turn the pillow often so that your head   is always resting on a cool surface. If you wake at night, sip cool water. Try different techniques for   getting back to sleep, such as meditation, paced respiration, or getting out of bed and reading until   you become sleepy.  ? Women who are overweight have more hot flashes, so maintain a healthy weight and exercise   regularly to decrease bothersome hot flashes and improve your overall health.   Nonprescription remedies  Although many nonprescription remedies reduce hot flashes, it’s likely that this is because of the “placebo   effect.” When nonprescription treatments are studied scientifically, they typically are as effective as a   placebo (inactive medication). Even if relief is because of the placebo effect, you can expect your hot   flashes to decrease by approximately 30% with most nonprescription remedies such as soy, herbs, or   acupuncture.  Nonprescription products do not receive careful oversight from the government and generally are not   studied carefully enough to know all potential risks and side effects, especially with long-term use. Consider   purchasing products made in [place] that follow good manufacturing practices. Let your healthcare   provider know if you are taking a nonprescription remedy.Nonprescription remedies you may consider for hot flash relief include  ? Soy: Eat one or two servings of soy foods daily (containing isoflavones), such as low-fat varieties of   tofu, tempeh, soymilk, or roasted soy nuts. Supplements containing soy isoflavones, such as  Promensil, reduce hot flashes in some studies.  ? Herbs: Supplements containing certain herbs like black cohosh, such as Remifemin, decrease hot   flashes  in some studies.   Flax seed oil (3 gms daily) has shown to decrease hot flashes  Prescription therapies  The following prescription medications reduce hot flashes more than placebos in scientific studies. They   may be good options if you have frequent, bothersome hot flashes. Every medication has risks and side   effects. Review your medical history with your healthcare provider when considering a prescription   medication.  Hormonal options  ? Prescription hormone therapy with estrogen is the most effective treatment for hot flashes. Although   using hormones can increase your risk of breast cancer and cardiovascular disease, studies show   that benefits may outweigh risks for healthy women younger than age 60 with moderate to severe   hot flashes. The goal is to use the lowest dose of hormone therapy that treats your symptoms for the   shortest time necessary. Women with a uterus need to combine estrogen with a progestogen.  ? A new option for women with a uterus combines estrogen with bazedoxifene to protect the uterus  (Duavee). Bazedoxifene is an estrogen agonist/antagonist, which means it works like estrogen in   some tissues and opposes estrogen’s actions in others.   ? If it has not been a full year since your last period and you are a healthy nonsmoker, you may   consider a combination estrogen-progestin birth control pill. This will provide contraception, hot flash   relief, and regular periods.  Nonhormonal options  You also may consider the following nonhormonal medications. They are more effective than placebo in   scientific studies, although not as effective as hormone therapy. Low-dose paroxetine (Brisdelle) is the only  government-approved nonhormonal option for treating hot flashes.  ? Antidepressants: Certain drugs approved to treat depression reduce hot flashes in women without depression.   Effective drugs include paroxetine (Paxil), venlafaxine (Effexor), and escitalopram (Lexapro). You    should not take paroxetine if you take tamoxifen for breast cancer.  ? Gabapentin (Neurontin) is a drug approved to treat epilepsy, migraine, and nerve pain, but it also   reduces hot flashes. It can cause excessive sleepiness, so it is an especially good option if you have   bothersome night sweats and take your gabapentin at bedtime.   ? Sleeping medications such as Ambien, Lunesta, and Benadryl will not reduce your hot flashes but   may help you sleep through them. Available both by prescription and nonprescription.   Veozah is not a hormone. It targets the neural activity which causes hot flashes during menopause.   Patients taking Veozah should take one 45 milligram pill orally, once a day, with or without food. The pill should be taken at the same time each day. If a dose is missed, or not taken at the regular time, patients should take it as soon as possible and return to their regular schedule the following day.  The effectiveness of Veozah to treat moderate to severe hot flashes was demonstrated in each of the first 12-week, randomized, placebo-controlled, double-blind portions of two phase 3 clinical trials. In both trials, after the first 12 weeks, the women on placebo were then re-randomized to Veozah for a 40-week extension study to evaluate safety. Each trial ran a total of 52 weeks. The average age of the trial participants was 54 years old.  The prescribing information for Veozah includes a warning for elevated hepatic transaminase, or liver injury. Before using Veozah, patients should have blood work done to test for liver damage. While on Veozah, routine bloodwork should be performed every three months for the first nine months of using the medication. Patients experiencing symptoms related to liver damage--such as nausea, vomiting, or yellowing of the skin and eyes--should contact a physician. Veozah cannot be used with CY inhibitors. Patients with known cirrhosis, severe renal damage or  end-stage renal disease should not take Veozah.   The most common side effects of Veozah include abdominal pain, diarrhea, insomnia, back pain, hot flush and elevated hepatic transaminases.   The FDA granted the Veozah application Priority Review designation.  The approval of Veozah was granted to [employer]

## 2024-09-26 ENCOUNTER — HOSPITAL ENCOUNTER (OUTPATIENT)
Dept: MAMMOGRAPHY | Age: 49
Discharge: HOME | End: 2024-09-26
Attending: ADVANCED PRACTICE MIDWIFE
Payer: COMMERCIAL

## 2024-09-26 DIAGNOSIS — Z12.31 SCREENING MAMMOGRAM FOR BREAST CANCER: ICD-10-CM

## 2024-09-26 PROCEDURE — 77067 SCR MAMMO BI INCL CAD: CPT | Performed by: ADVANCED PRACTICE MIDWIFE

## 2024-09-26 PROCEDURE — 77063 BREAST TOMOSYNTHESIS BI: CPT | Performed by: ADVANCED PRACTICE MIDWIFE

## (undated) NOTE — Clinical Note
Referring this pt to you. Started her on Welburtin. 150 XL. She has taken in the past for anxiety and depression. She also has HTN she needs a more convenient PCP.   Is going down to [facility] now

## (undated) NOTE — MR AVS SNAPSHOT
After Visit Summary   12/20/2022    Doe De La Garza   MRN: [id number]           Visit Information     Date & Time  12/20/2022  2:00 PM Provider  Melina Perez, [address] Phone  [phone] Were  Most recent update: 12/20/2022  2:22 PM    BP   137/98   (BP Location: Right arm, Patient Position: Sitting)    Pulse   101    LMP   11/03/2016           Allergies as of 12/20/2022  Review status set to Review Complete on 12/20/2022   No Known Allergies     Your Current Medications        Dosage    Amphetamine-Dextroamphet ER 25 MG Oral Capsule SR 24 Hr Take 1 capsule (25 mg total) by mouth every morning. Amphetamine-Dextroamphet ER 20 MG Oral Capsule SR 24 Hr Take 1 capsule (20 mg total) by mouth every morning. escitalopram 10 MG Oral Tab Take 15 mg by mouth daily. amLODIPine Besylate 5 MG Oral Tab TK 1 T PO QD    hydrochlorothiazide 25 MG Oral Tab TK 1 T PO QD    Zolpidem Tartrate 10 MG Oral Tab Take by mouth nightly as needed. Diagnoses for This Visit    Women's annual routine gynecological examination   [8753182]  -  Primary  Flu vaccine need   [638353]    Screening for diabetes mellitus   [V77. 1. ICD-9-CM]    Screening for thyroid disorder   [V77. 0. ICD-9-CM]    Screening, lipid   [137886]    Screening, anemia, deficiency, iron   [954566]    History of HPV infection   [807951]             We Ordered the Following     Normal Orders This Visit    HPV HIGH RISK , THIN PREP COLLECTION [[id number] CUSTOM]     THINPREP PAP SMEAR B [[id number] CUSTOM]     THINPREP PAP SMEAR ONLY [[id number] CUSTOM]     Future Labs/Procedures Expected by Expires    ASSAY, THYROID STIM HORMONE [[id number] CUSTOM]  12/20/2022 (Approximate) 12/20/2023    CBC, PLATELET; NO DIFFERENTIAL [[id number] CUSTOM]  12/20/2022 (Approximate) 27/40/8343    COMP METABOLIC PANEL (14) [[id number] CUSTOM]  12/20/2022 (Approximate) 12/20/2023    HEMOGLOBIN A1C [[id number] CUSTOM]  12/20/2022 (Approximate) 12/20/2023    HPV HIGH RISK , THIN PREP COLLECTION [[id number] CUSTOM]  12/20/2022 12/20/2023    LIPID PANEL [[id number] CUSTOM]  12/20/2022 (Approximate) 12/20/2023    THINPREP PAP SMEAR B [[id number] CUSTOM]  12/20/2022 12/20/2023                Did you know that [facility] primary care physicians now offer Video Visits through [address] for adult patients for a variety of conditions such as allergies, back pain and cold symptoms? Skip the drive and waiting room and online chat with a doctor face-to-face using your web-cam enabled computer or mobile device wherever you are. Video Visits cost $50 and can be paid hassle-free using a credit, debit, or health savings card. Not active on [employer]? Ask us how to get signed up today! If you receive a survey from "[employer] please take a few minutes to complete it and provide feedback. We strive to deliver the best patient experience and are looking for ways to make improvements. Your feedback will help us do so. For more information on [employer], please visit [email or web address]           No text in SmartText           No text in SmartText

## (undated) NOTE — LETTER
11/15/2020              Jj Maravilla        [address] [address]         Dear Mona Jay,    [facility] records indicate that the mammogram test ordered for you by NOELLE Botello  has not been done.   If you have, in fact, malia

## (undated) NOTE — MR AVS SNAPSHOT
After Visit Summary   9/17/2019    Adali Davila    MRN: [id number]           Visit Information     Date & Time  9/17/2019 12:45 PM Provider  Willem Denton [address], [facility]  Dept.  Phone  [phone] Around September 17, 2019   Imaging:   [facility] ABRAN 2D+3D SCREENING BILAT ([phone]=[id number])    Instructions:   To schedule a test at any [facility], call Central Scheduling at [phone], Monday through Friday between 7:30am to 6 [facility] now offers Video Visits through [address] for adult and pediatric patients. Video Visits are available Monday - Friday for many common conditions such as allergies, colds, cough, fever, rash, sore throat, headache and pink eye.   The cost for a Video Vi at a hospital emergency room. Average cost  $2,300*   *Cost varies based on your insurance coverage  For more information about hours, locations or appointment options available at [facility],  visit: [email or web address] or call [phone]. (1

## (undated) NOTE — MR AVS SNAPSHOT
[facility]  [address]  [phone]               Thank you for choosing us for your health care visit with Ralph Waterman NP.   We are glad to serve you and happy to provide you with this summary of your vis · Your appetite may be poor, so a light diet is fine. Avoid dehydration by drinking 6 to 8 glasses of fluids per day (water, soft drinks, juices, tea, or soup). Extra fluids will help loosen secretions in the nose and lungs.   · Over-the-counter cold medici no eating or activity restrictions for this exam.              Allergies as of Feb 15, 2017     No Known Allergies                Today's Vital Signs     BP Pulse Temp             110/80 mmHg 92 98.2 °F (36.8 °C) (Oral)            Current Medications office, you can view your past visit information in [employer] by going to Visits < Visit Summaries. [facility]a questions? Call [phone] for help. [employer] is NOT to be used for urgent needs. For medical emergencies, dial 911.            Visit [facility]